# Patient Record
Sex: FEMALE | Race: WHITE | NOT HISPANIC OR LATINO | Employment: FULL TIME | URBAN - METROPOLITAN AREA
[De-identification: names, ages, dates, MRNs, and addresses within clinical notes are randomized per-mention and may not be internally consistent; named-entity substitution may affect disease eponyms.]

---

## 2023-03-30 ENCOUNTER — TELEPHONE (OUTPATIENT)
Dept: PRIMARY CARE | Facility: CLINIC | Age: 68
End: 2023-03-30
Payer: MEDICARE

## 2023-03-30 DIAGNOSIS — R63.2 BINGE EATING: Primary | ICD-10-CM

## 2023-03-30 RX ORDER — LISDEXAMFETAMINE DIMESYLATE 40 MG/1
40 CAPSULE ORAL DAILY
Qty: 30 CAPSULE | Refills: 0 | Status: SHIPPED | OUTPATIENT
Start: 2023-03-30 | End: 2023-04-06 | Stop reason: SDUPTHER

## 2023-03-30 RX ORDER — LISDEXAMFETAMINE DIMESYLATE 40 MG/1
40 CAPSULE ORAL DAILY
COMMUNITY
End: 2023-03-30 | Stop reason: SDUPTHER

## 2023-04-05 NOTE — TELEPHONE ENCOUNTER
Needs to be resent  Says [facility], but it needs to be resent to [facility] because the last name didn't match so the script got canceled   [place]

## 2023-04-06 RX ORDER — LISDEXAMFETAMINE DIMESYLATE 40 MG/1
40 CAPSULE ORAL DAILY
Qty: 30 CAPSULE | Refills: 0 | Status: SHIPPED | OUTPATIENT
Start: 2023-04-06 | End: 2023-05-08 | Stop reason: SDUPTHER

## 2023-04-06 NOTE — TELEPHONE ENCOUNTER
This actually needs resent to [place] [facility], it was supposed to go there originally but accidentally sent to [facility]. Can you please just send this to [place]?

## 2023-05-08 ENCOUNTER — TELEPHONE (OUTPATIENT)
Dept: PRIMARY CARE | Facility: CLINIC | Age: 68
End: 2023-05-08
Payer: MEDICARE

## 2023-05-08 DIAGNOSIS — R63.2 BINGE EATING: ICD-10-CM

## 2023-05-08 RX ORDER — LISDEXAMFETAMINE DIMESYLATE 40 MG/1
40 CAPSULE ORAL DAILY
Qty: 30 CAPSULE | Refills: 0 | Status: SHIPPED | OUTPATIENT
Start: 2023-05-08 | End: 2023-10-02 | Stop reason: ALTCHOICE

## 2023-05-10 LAB
ALANINE AMINOTRANSFERASE (SGPT) (U/L) IN SER/PLAS: 14 U/L (ref 7–45)
ALBUMIN (G/DL) IN SER/PLAS: 3.8 G/DL (ref 3.4–5)
ALKALINE PHOSPHATASE (U/L) IN SER/PLAS: 62 U/L (ref 33–136)
ANION GAP IN SER/PLAS: 9 MMOL/L (ref 10–20)
ASPARTATE AMINOTRANSFERASE (SGOT) (U/L) IN SER/PLAS: 18 U/L (ref 9–39)
BASOPHILS (10*3/UL) IN BLOOD BY AUTOMATED COUNT: 0.06 X10E9/L (ref 0–0.1)
BASOPHILS/100 LEUKOCYTES IN BLOOD BY AUTOMATED COUNT: 1.1 % (ref 0–2)
BILIRUBIN TOTAL (MG/DL) IN SER/PLAS: 0.6 MG/DL (ref 0–1.2)
CALCIUM (MG/DL) IN SER/PLAS: 8.8 MG/DL (ref 8.6–10.3)
CARBON DIOXIDE, TOTAL (MMOL/L) IN SER/PLAS: 28 MMOL/L (ref 21–32)
CHLORIDE (MMOL/L) IN SER/PLAS: 107 MMOL/L (ref 98–107)
CHOLESTEROL (MG/DL) IN SER/PLAS: 192 MG/DL (ref 0–199)
CHOLESTEROL IN HDL (MG/DL) IN SER/PLAS: 52.5 MG/DL
CHOLESTEROL/HDL RATIO: 3.7
CREATININE (MG/DL) IN SER/PLAS: 0.89 MG/DL (ref 0.5–1.05)
EOSINOPHILS (10*3/UL) IN BLOOD BY AUTOMATED COUNT: 0.17 X10E9/L (ref 0–0.7)
EOSINOPHILS/100 LEUKOCYTES IN BLOOD BY AUTOMATED COUNT: 3 % (ref 0–6)
ERYTHROCYTE DISTRIBUTION WIDTH (RATIO) BY AUTOMATED COUNT: 14 % (ref 11.5–14.5)
ERYTHROCYTE MEAN CORPUSCULAR HEMOGLOBIN CONCENTRATION (G/DL) BY AUTOMATED: 31.7 G/DL (ref 32–36)
ERYTHROCYTE MEAN CORPUSCULAR VOLUME (FL) BY AUTOMATED COUNT: 90 FL (ref 80–100)
ERYTHROCYTES (10*6/UL) IN BLOOD BY AUTOMATED COUNT: 4.72 X10E12/L (ref 4–5.2)
GFR FEMALE: 71 ML/MIN/1.73M2
GLUCOSE (MG/DL) IN SER/PLAS: 84 MG/DL (ref 74–99)
HEMATOCRIT (%) IN BLOOD BY AUTOMATED COUNT: 42.6 % (ref 36–46)
HEMOGLOBIN (G/DL) IN BLOOD: 13.5 G/DL (ref 12–16)
IMMATURE GRANULOCYTES/100 LEUKOCYTES IN BLOOD BY AUTOMATED COUNT: 0.4 % (ref 0–0.9)
LDL: 117 MG/DL (ref 0–99)
LEUKOCYTES (10*3/UL) IN BLOOD BY AUTOMATED COUNT: 5.7 X10E9/L (ref 4.4–11.3)
LYMPHOCYTES (10*3/UL) IN BLOOD BY AUTOMATED COUNT: 1.07 X10E9/L (ref 1.2–4.8)
LYMPHOCYTES/100 LEUKOCYTES IN BLOOD BY AUTOMATED COUNT: 18.8 % (ref 13–44)
MONOCYTES (10*3/UL) IN BLOOD BY AUTOMATED COUNT: 0.58 X10E9/L (ref 0.1–1)
MONOCYTES/100 LEUKOCYTES IN BLOOD BY AUTOMATED COUNT: 10.2 % (ref 2–10)
NEUTROPHILS (10*3/UL) IN BLOOD BY AUTOMATED COUNT: 3.8 X10E9/L (ref 1.2–7.7)
NEUTROPHILS/100 LEUKOCYTES IN BLOOD BY AUTOMATED COUNT: 66.5 % (ref 40–80)
PLATELETS (10*3/UL) IN BLOOD AUTOMATED COUNT: 305 X10E9/L (ref 150–450)
POTASSIUM (MMOL/L) IN SER/PLAS: 4.3 MMOL/L (ref 3.5–5.3)
PROTEIN TOTAL: 6.4 G/DL (ref 6.4–8.2)
SODIUM (MMOL/L) IN SER/PLAS: 140 MMOL/L (ref 136–145)
THYROTROPIN (MIU/L) IN SER/PLAS BY DETECTION LIMIT <= 0.05 MIU/L: 1.8 MIU/L (ref 0.44–3.98)
TRIGLYCERIDE (MG/DL) IN SER/PLAS: 114 MG/DL (ref 0–149)
UREA NITROGEN (MG/DL) IN SER/PLAS: 18 MG/DL (ref 6–23)
VLDL: 23 MG/DL (ref 0–40)

## 2023-05-12 ENCOUNTER — TELEPHONE (OUTPATIENT)
Dept: PRIMARY CARE | Facility: CLINIC | Age: 68
End: 2023-05-12
Payer: MEDICARE

## 2023-05-12 NOTE — TELEPHONE ENCOUNTER
----- Message from DORIS Woods sent at 5/10/2023 10:23 PM EDT -----  Labs essentially normal. Will discuss at upcoming office visit

## 2023-05-18 ENCOUNTER — APPOINTMENT (OUTPATIENT)
Dept: PRIMARY CARE | Facility: CLINIC | Age: 68
End: 2023-05-18
Payer: MEDICARE

## 2023-06-19 ENCOUNTER — OFFICE VISIT (OUTPATIENT)
Dept: PRIMARY CARE | Facility: CLINIC | Age: 68
End: 2023-06-19
Payer: MEDICARE

## 2023-06-19 VITALS
TEMPERATURE: 97 F | WEIGHT: 219 LBS | DIASTOLIC BLOOD PRESSURE: 70 MMHG | HEART RATE: 64 BPM | SYSTOLIC BLOOD PRESSURE: 128 MMHG | BODY MASS INDEX: 34.3 KG/M2 | OXYGEN SATURATION: 98 %

## 2023-06-19 DIAGNOSIS — R63.2 BINGE EATING: Primary | ICD-10-CM

## 2023-06-19 DIAGNOSIS — F90.0 ATTENTION DEFICIT HYPERACTIVITY DISORDER (ADHD), PREDOMINANTLY INATTENTIVE TYPE: ICD-10-CM

## 2023-06-19 LAB
AMPHETAMINE (PRESENCE) IN URINE BY SCREEN METHOD: ABNORMAL
BARBITURATES PRESENCE IN URINE BY SCREEN METHOD: ABNORMAL
BENZODIAZEPINE (PRESENCE) IN URINE BY SCREEN METHOD: ABNORMAL
CANNABINOIDS IN URINE BY SCREEN METHOD: ABNORMAL
COCAINE (PRESENCE) IN URINE BY SCREEN METHOD: ABNORMAL
DRUG SCREEN COMMENT URINE: ABNORMAL
FENTANYL URINE: ABNORMAL
METHADONE (PRESENCE) IN URINE BY SCREEN METHOD: ABNORMAL
OPIATES (PRESENCE) IN URINE BY SCREEN METHOD: ABNORMAL
OXYCODONE (PRESENCE) IN URINE BY SCREEN METHOD: ABNORMAL
PHENCYCLIDINE (PRESENCE) IN URINE BY SCREEN METHOD: ABNORMAL

## 2023-06-19 PROCEDURE — 99213 OFFICE O/P EST LOW 20 MIN: CPT | Performed by: NURSE PRACTITIONER

## 2023-06-19 PROCEDURE — 80307 DRUG TEST PRSMV CHEM ANLYZR: CPT

## 2023-06-19 PROCEDURE — 80324 DRUG SCREEN AMPHETAMINES 1/2: CPT

## 2023-06-19 PROCEDURE — 1159F MED LIST DOCD IN RCRD: CPT | Performed by: NURSE PRACTITIONER

## 2023-06-19 RX ORDER — LISDEXAMFETAMINE DIMESYLATE 40 MG/1
40 CAPSULE ORAL EVERY MORNING
Qty: 30 CAPSULE | Refills: 0 | Status: SHIPPED | OUTPATIENT
Start: 2023-06-19 | End: 2023-12-13 | Stop reason: ALTCHOICE

## 2023-06-19 RX ORDER — LISDEXAMFETAMINE DIMESYLATE 40 MG/1
40 CAPSULE ORAL EVERY MORNING
Qty: 30 CAPSULE | Refills: 0 | Status: SHIPPED | OUTPATIENT
Start: 2023-07-19 | End: 2023-12-13 | Stop reason: ALTCHOICE

## 2023-06-19 RX ORDER — LEVOTHYROXINE SODIUM 125 UG/1
125 TABLET ORAL
COMMUNITY
End: 2023-11-01 | Stop reason: SDUPTHER

## 2023-06-19 RX ORDER — LISDEXAMFETAMINE DIMESYLATE 40 MG/1
40 CAPSULE ORAL EVERY MORNING
Qty: 30 CAPSULE | Refills: 0 | Status: SHIPPED | OUTPATIENT
Start: 2023-08-18 | End: 2023-12-13 | Stop reason: ALTCHOICE

## 2023-06-19 ASSESSMENT — ENCOUNTER SYMPTOMS
NEUROLOGICAL NEGATIVE: 1
HEMATOLOGIC/LYMPHATIC NEGATIVE: 1
PSYCHIATRIC NEGATIVE: 1
ENDOCRINE NEGATIVE: 1
ALLERGIC/IMMUNOLOGIC NEGATIVE: 1
RESPIRATORY NEGATIVE: 1
GASTROINTESTINAL NEGATIVE: 1
CARDIOVASCULAR NEGATIVE: 1
EYES NEGATIVE: 1
MUSCULOSKELETAL NEGATIVE: 1
CONSTITUTIONAL NEGATIVE: 1

## 2023-06-19 NOTE — PATIENT INSTRUCTIONS
Urine Drug Screen today   Info on Intermittent fasting     Call in 3 months for refills and we will see you in 6 months

## 2023-06-19 NOTE — PROGRESS NOTES
Subjective   Patient ID: Trini Edge is a 67 y.o. female who presents for Med Refill.    Med Refill     Patient here for medication refill of vyvnase.  No other concerns or complaints.   Recently had two teeth removed to prepared for implants from failed root canals.   Review of Systems   Constitutional: Negative.    HENT:  Positive for dental problem and tinnitus.    Eyes: Negative.    Respiratory: Negative.     Cardiovascular: Negative.    Gastrointestinal: Negative.    Endocrine: Negative.    Genitourinary: Negative.    Musculoskeletal: Negative.    Allergic/Immunologic: Negative.    Neurological: Negative.         Episodes of vertigo, takes meclizine with good affect.    Hematological: Negative.    Psychiatric/Behavioral: Negative.         Objective   /82   Pulse 64   Temp 36.1 °C (97 °F)   Wt 99.3 kg (219 lb)   SpO2 98%   BMI 34.30 kg/m²     Physical Exam  Constitutional:       Appearance: Normal appearance.   Cardiovascular:      Rate and Rhythm: Normal rate and regular rhythm.      Pulses: Normal pulses.      Heart sounds: Normal heart sounds.   Pulmonary:      Effort: Pulmonary effort is normal.      Breath sounds: Normal breath sounds.   Abdominal:      General: Bowel sounds are normal.      Palpations: Abdomen is soft.   Skin:     General: Skin is warm.   Neurological:      General: No focal deficit present.      Mental Status: She is alert and oriented to person, place, and time. Mental status is at baseline.   Psychiatric:         Mood and Affect: Mood normal.         Behavior: Behavior normal.         Thought Content: Thought content normal.         Judgment: Judgment normal.   OARRS:  No data recorded  I have personally reviewed the OARRS report for Trini Edge. I have considered the risks of abuse, dependence, addiction and diversion    Is the patient prescribed a combination of a benzodiazepine and opioid?  Yes, I feel it is clincially indicated to continue the medication and have  discussed with the patient risks/benefits/alternatives.    Last Urine Drug Screen / ordered today: Yes  Recent Results (from the past 17964 hour(s))   Amphetamine Confirm, Urine    Collection Time: 03/02/22  8:30 PM   Result Value Ref Range    Amphetamines,Urine 2,862 ng/mL    MDA, Urine <200 ng/mL    MDEA, Urine <200 ng/mL    MDMA, Urine <200 ng/mL    Methamphetamine Quant, Ur <200 ng/mL    Phentermine,Urine <200 ng/mL   Drug Screen, Urine With Reflex to Confirmation    Collection Time: 03/02/22  8:30 PM   Result Value Ref Range    DRUG SCREEN COMMENT URINE SEE BELOW     Amphetamine Screen, Urine PRESUMPTIVE POSITIVE (A) NEGATIVE    Barbiturate Screen, Urine PRESUMPTIVE NEGATIVE NEGATIVE    BENZODIAZEPINE (PRESENCE) IN URINE BY SCREEN METHOD PRESUMPTIVE NEGATIVE NEGATIVE    Cannabinoid Screen, Urine PRESUMPTIVE NEGATIVE NEGATIVE    Cocaine Screen, Urine PRESUMPTIVE NEGATIVE NEGATIVE    Fentanyl, Ur PRESUMPTIVE NEGATIVE NEGATIVE    Methadone Screen, Urine PRESUMPTIVE NEGATIVE NEGATIVE    Opiate Screen, Urine PRESUMPTIVE NEGATIVE NEGATIVE    Oxycodone Screen, Ur PRESUMPTIVE NEGATIVE NEGATIVE    PCP Screen, Urine PRESUMPTIVE NEGATIVE NEGATIVE     N/A    Controlled Substance Agreement:  Date of the Last Agreement: 6/19/23  Reviewed Controlled Substance Agreement including but not limited to the benefits, risks, and alternatives to treatment with a Controlled Substance medication(s).      Stimulants:   What is the patient's goal of therapy? Binge eating control and ADHD  Is this being achieved with current treatment? yes    Activities of Daily Living:   Is your overall impression that this patient is benefiting (symptom reduction outweighs side effects) from stimulant therapy? Yes     1. Physical Functioning: Same  2. Family Relationship: Same  3. Social Relationship: Same  4. Mood: Same  5. Sleep Patterns: Same  6. Overall Function: Better      Assessment/Plan   Problem List Items Addressed This Visit       Binge  eating - Primary    Relevant Medications    lisdexamfetamine (Vyvanse) 40 mg capsule    lisdexamfetamine (Vyvanse) 40 mg capsule (Start on 7/19/2023)    lisdexamfetamine (Vyvanse) 40 mg capsule (Start on 8/18/2023)    Other Relevant Orders    Drug Screen, Urine With Reflex to Confirmation     Other Visit Diagnoses       Attention deficit hyperactivity disorder (ADHD), predominantly inattentive type        Relevant Medications    lisdexamfetamine (Vyvanse) 40 mg capsule    lisdexamfetamine (Vyvanse) 40 mg capsule (Start on 7/19/2023)    lisdexamfetamine (Vyvanse) 40 mg capsule (Start on 8/18/2023)    Other Relevant Orders    Drug Screen, Urine With Reflex to Confirmation          Urine Drug Screen today   Info on Intermittent fasting     Call in 3 months for refills and we will see you in 6 months

## 2023-06-22 LAB
AMPHETAMINES,URINE: 808 NG/ML
MDA,URINE: <200 NG/ML
MDEA,URINE: <200 NG/ML
MDMA,UR: <200 NG/ML
METHAMPHETAMINE QUANTITATIVE URINE: <200 NG/ML
PHENTERMINE,UR: <200 NG/ML

## 2023-10-02 DIAGNOSIS — R63.2 BINGE EATING: ICD-10-CM

## 2023-10-03 RX ORDER — LISDEXAMFETAMINE DIMESYLATE 40 MG/1
40 CAPSULE ORAL DAILY
Qty: 30 CAPSULE | Refills: 0 | Status: SHIPPED | OUTPATIENT
Start: 2023-10-03 | End: 2023-11-14 | Stop reason: SDUPTHER

## 2023-10-31 PROBLEM — J30.9 ALLERGIC RHINITIS: Status: ACTIVE | Noted: 2023-10-31

## 2023-10-31 PROBLEM — E66.9 OBESITY: Status: ACTIVE | Noted: 2023-10-31

## 2023-10-31 PROBLEM — E78.5 HYPERLIPEMIA: Status: ACTIVE | Noted: 2023-10-31

## 2023-10-31 PROBLEM — R03.0 BLOOD PRESSURE ELEVATED WITHOUT HISTORY OF HTN: Status: ACTIVE | Noted: 2023-10-31

## 2023-10-31 PROBLEM — M25.562 CHRONIC PAIN OF BOTH KNEES: Status: ACTIVE | Noted: 2023-10-31

## 2023-10-31 PROBLEM — F32.A DEPRESSION: Status: ACTIVE | Noted: 2023-10-31

## 2023-10-31 PROBLEM — M17.0 OSTEOARTHRITIS OF KNEES, BILATERAL: Status: ACTIVE | Noted: 2023-10-31

## 2023-10-31 PROBLEM — G89.29 CHRONIC PAIN OF BOTH KNEES: Status: ACTIVE | Noted: 2023-10-31

## 2023-10-31 PROBLEM — R91.8 LUNG NODULES: Status: ACTIVE | Noted: 2023-10-31

## 2023-10-31 PROBLEM — L71.9 ROSACEA: Status: ACTIVE | Noted: 2023-10-31

## 2023-10-31 PROBLEM — M25.561 CHRONIC PAIN OF BOTH KNEES: Status: ACTIVE | Noted: 2023-10-31

## 2023-10-31 PROBLEM — E03.9 ADULT HYPOTHYROIDISM: Status: ACTIVE | Noted: 2023-10-31

## 2023-10-31 PROBLEM — D17.23 LIPOMA OF RIGHT LOWER EXTREMITY: Status: ACTIVE | Noted: 2023-10-31

## 2023-10-31 PROBLEM — E89.0 POSTOPERATIVE PRIMARY HYPOTHYROIDISM: Status: ACTIVE | Noted: 2023-10-31

## 2023-10-31 PROBLEM — Z79.899 LONG-TERM CURRENT USE OF HIGH RISK MEDICATION OTHER THAN ANTICOAGULANT: Status: ACTIVE | Noted: 2023-10-31

## 2023-10-31 RX ORDER — LISDEXAMFETAMINE DIMESYLATE CAPSULES 10 MG/1
10 CAPSULE ORAL DAILY
COMMUNITY
End: 2023-11-01 | Stop reason: WASHOUT

## 2023-10-31 RX ORDER — IBUPROFEN 600 MG/1
600 TABLET ORAL EVERY 6 HOURS PRN
COMMUNITY
Start: 2021-12-07 | End: 2023-11-01 | Stop reason: WASHOUT

## 2023-10-31 RX ORDER — CALCIUM CARBONATE 500(1250)
1 TABLET,CHEWABLE ORAL
COMMUNITY
Start: 2021-12-07 | End: 2023-11-01 | Stop reason: WASHOUT

## 2023-10-31 RX ORDER — MULTIVITAMIN
1 TABLET ORAL DAILY
COMMUNITY
Start: 2007-03-27

## 2023-10-31 RX ORDER — IBUPROFEN 200 MG
CAPSULE ORAL
COMMUNITY
Start: 2021-12-17 | End: 2023-12-13 | Stop reason: ALTCHOICE

## 2023-10-31 RX ORDER — ACETAMINOPHEN 325 MG/1
2 TABLET ORAL EVERY 6 HOURS PRN
COMMUNITY
Start: 2021-12-07 | End: 2023-11-01 | Stop reason: WASHOUT

## 2023-10-31 RX ORDER — METHYLPREDNISOLONE 4 MG
1 TABLET, DOSE PACK ORAL 3 TIMES DAILY
COMMUNITY
Start: 2007-07-27

## 2023-10-31 RX ORDER — OMEGA-3 FATTY ACIDS 1000 MG
1000 CAPSULE ORAL DAILY
COMMUNITY
Start: 2007-03-27

## 2023-10-31 NOTE — PATIENT INSTRUCTIONS
Thank you for choosing [facility]  for your health care needs.  If you have any questions, concerns or medical needs, please feel free to contact our office at [phone].    Please ensure you complete your blood work one week before the next scheduled appointment.       To obtain blood tests   To continue Levothyroxine 125mcg po daily   Take levothyroxine on an empty stomach with water alone, 30-60 minutes before eating or taking other medications, 4 hours before any calcium or iron supplement  For follow up in 6 months

## 2023-10-31 NOTE — PROGRESS NOTES
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     The patient underwent a total thyroidectomy in December 2021. His surgical pathology revealed follicular nodular disease.     She has had no major changes to her health since her last appointment.  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 daily     Symptoms are as listed below:  Energy levels -fatigued  Sleep - NP started her on Vyvanse which has been helpful   Temperature intolerances -admits to cold   Bowel movements -regular; once daily   Hair changes -grey and different texture (more thick)  Skin changes -dry; chronically; fingertips turn white in the cold; itching to right thigh where she had shingles   Palpitations -denies  Diaphoresis -denies   Tremors -denies   Mood - denies changes   Weight changes -stable    Objective   /64 (BP Location: Right arm, Patient Position: Sitting, BP Cuff Size: Large adult)   Pulse 75   Ht 1.727 m (5' 8\")   Wt 99.4 kg (219 lb 3.2 oz)   BMI 33.33 kg/m²    Physical Exam  Vitals and nursing note reviewed.   Constitutional:       General: She is not in acute distress.     Appearance: Normal appearance. She is normal weight.   HENT:      Head: Normocephalic and atraumatic.      Nose: Nose normal.      Mouth/Throat:      Mouth: Mucous membranes are moist.   Eyes:      Extraocular Movements: Extraocular movements intact.   Neck:      Comments: Healed thyroidectomy scar  Cardiovascular:      Rate and Rhythm: Normal rate and regular rhythm.   Pulmonary:      Effort: Pulmonary effort is normal.      Breath sounds: Normal breath sounds.   Musculoskeletal:         General: Normal range of motion.   Skin:     General: Skin is warm.   Neurological:      Mental Status: She is alert and oriented to person, place, and time.   Psychiatric:         Mood and Affect: Mood normal.         Lab Results "   Component Value Date    TSH 1.80 05/10/2023    FREET4 1.40 (H) 11/28/2022       Assessment/Plan   67 year old female presents for follow-up for postoperative hypothyroidism. She underwent a total thyroidectomy in December 2021. Surgical pathology revealed follicular nodular disease.     Postoperative primary hypothyroidism  To obtain blood tests   To continue Levothyroxine 125mcg po daily   Take levothyroxine on an empty stomach with water alone, 30-60 minutes before eating or taking other medications, 4 hours before any calcium or iron supplement  For follow up in 6 months

## 2023-11-01 ENCOUNTER — LAB (OUTPATIENT)
Dept: LAB | Facility: LAB | Age: 68
End: 2023-11-01
Payer: MEDICARE

## 2023-11-01 ENCOUNTER — OFFICE VISIT (OUTPATIENT)
Dept: ENDOCRINOLOGY | Facility: CLINIC | Age: 68
End: 2023-11-01
Payer: MEDICARE

## 2023-11-01 VITALS
SYSTOLIC BLOOD PRESSURE: 126 MMHG | HEIGHT: 68 IN | DIASTOLIC BLOOD PRESSURE: 64 MMHG | HEART RATE: 75 BPM | WEIGHT: 219.2 LBS | BODY MASS INDEX: 33.22 KG/M2

## 2023-11-01 DIAGNOSIS — E89.0 POSTOPERATIVE PRIMARY HYPOTHYROIDISM: ICD-10-CM

## 2023-11-01 DIAGNOSIS — E89.0 POSTOPERATIVE PRIMARY HYPOTHYROIDISM: Primary | ICD-10-CM

## 2023-11-01 LAB
T4 FREE SERPL-MCNC: 1.07 NG/DL (ref 0.61–1.12)
TSH SERPL-ACNC: 5.97 MIU/L (ref 0.44–3.98)

## 2023-11-01 PROCEDURE — 36415 COLL VENOUS BLD VENIPUNCTURE: CPT

## 2023-11-01 PROCEDURE — 1160F RVW MEDS BY RX/DR IN RCRD: CPT | Performed by: INTERNAL MEDICINE

## 2023-11-01 PROCEDURE — 1159F MED LIST DOCD IN RCRD: CPT | Performed by: INTERNAL MEDICINE

## 2023-11-01 PROCEDURE — 84443 ASSAY THYROID STIM HORMONE: CPT

## 2023-11-01 PROCEDURE — 99213 OFFICE O/P EST LOW 20 MIN: CPT | Performed by: INTERNAL MEDICINE

## 2023-11-01 PROCEDURE — 1036F TOBACCO NON-USER: CPT | Performed by: INTERNAL MEDICINE

## 2023-11-01 PROCEDURE — 84439 ASSAY OF FREE THYROXINE: CPT

## 2023-11-01 RX ORDER — LEVOTHYROXINE SODIUM 125 UG/1
125 TABLET ORAL
Qty: 30 TABLET | Refills: 5 | Status: SHIPPED | OUTPATIENT
Start: 2023-11-01 | End: 2023-11-06 | Stop reason: SDUPTHER

## 2023-11-06 DIAGNOSIS — E89.0 POSTOPERATIVE PRIMARY HYPOTHYROIDISM: ICD-10-CM

## 2023-11-06 RX ORDER — LEVOTHYROXINE SODIUM 125 UG/1
TABLET ORAL
Qty: 32 TABLET | Refills: 5 | Status: SHIPPED | OUTPATIENT
Start: 2023-11-06 | End: 2024-04-22

## 2023-11-06 NOTE — RESULT ENCOUNTER NOTE
Labs have been reviewed. The FT4 has normalized but the TSH is elevated above goal.  Please change Levothyroxine to 1 tablet six days per week and 1.5 tablets 1 day per week.  For follow up as scheduled with repeat labs.

## 2023-11-14 ENCOUNTER — TELEPHONE (OUTPATIENT)
Dept: PRIMARY CARE | Facility: CLINIC | Age: 68
End: 2023-11-14
Payer: MEDICARE

## 2023-11-14 DIAGNOSIS — R63.2 BINGE EATING: ICD-10-CM

## 2023-11-14 RX ORDER — LISDEXAMFETAMINE DIMESYLATE 40 MG/1
40 CAPSULE ORAL DAILY
Qty: 30 CAPSULE | Refills: 0 | Status: SHIPPED | OUTPATIENT
Start: 2023-11-14 | End: 2023-12-13 | Stop reason: SDUPTHER

## 2023-12-13 ENCOUNTER — OFFICE VISIT (OUTPATIENT)
Dept: PRIMARY CARE | Facility: CLINIC | Age: 68
End: 2023-12-13
Payer: MEDICARE

## 2023-12-13 VITALS
HEIGHT: 68 IN | SYSTOLIC BLOOD PRESSURE: 130 MMHG | WEIGHT: 218 LBS | DIASTOLIC BLOOD PRESSURE: 80 MMHG | HEART RATE: 68 BPM | BODY MASS INDEX: 33.04 KG/M2

## 2023-12-13 DIAGNOSIS — R63.2 BINGE EATING: ICD-10-CM

## 2023-12-13 DIAGNOSIS — Z13.820 OSTEOPOROSIS SCREENING: ICD-10-CM

## 2023-12-13 DIAGNOSIS — F90.0 ATTENTION DEFICIT HYPERACTIVITY DISORDER (ADHD), PREDOMINANTLY INATTENTIVE TYPE: ICD-10-CM

## 2023-12-13 DIAGNOSIS — Z00.00 MEDICARE ANNUAL WELLNESS VISIT, SUBSEQUENT: Primary | ICD-10-CM

## 2023-12-13 PROCEDURE — 1159F MED LIST DOCD IN RCRD: CPT | Performed by: FAMILY MEDICINE

## 2023-12-13 PROCEDURE — G0009 ADMIN PNEUMOCOCCAL VACCINE: HCPCS | Performed by: FAMILY MEDICINE

## 2023-12-13 PROCEDURE — 1160F RVW MEDS BY RX/DR IN RCRD: CPT | Performed by: FAMILY MEDICINE

## 2023-12-13 PROCEDURE — 99213 OFFICE O/P EST LOW 20 MIN: CPT | Performed by: FAMILY MEDICINE

## 2023-12-13 PROCEDURE — 1170F FXNL STATUS ASSESSED: CPT | Performed by: FAMILY MEDICINE

## 2023-12-13 PROCEDURE — 1036F TOBACCO NON-USER: CPT | Performed by: FAMILY MEDICINE

## 2023-12-13 PROCEDURE — 90732 PPSV23 VACC 2 YRS+ SUBQ/IM: CPT | Performed by: FAMILY MEDICINE

## 2023-12-13 RX ORDER — LISDEXAMFETAMINE DIMESYLATE 40 MG/1
40 CAPSULE ORAL EVERY MORNING
Qty: 30 CAPSULE | Refills: 0 | Status: SHIPPED | OUTPATIENT
Start: 2024-02-10 | End: 2024-04-09 | Stop reason: SDUPTHER

## 2023-12-13 RX ORDER — LISDEXAMFETAMINE DIMESYLATE 40 MG/1
40 CAPSULE ORAL EVERY MORNING
Qty: 30 CAPSULE | Refills: 0 | Status: SHIPPED | OUTPATIENT
Start: 2024-01-12 | End: 2024-05-14 | Stop reason: SDUPTHER

## 2023-12-13 RX ORDER — LISDEXAMFETAMINE DIMESYLATE 40 MG/1
40 CAPSULE ORAL DAILY
Qty: 30 CAPSULE | Refills: 0 | Status: SHIPPED | OUTPATIENT
Start: 2023-12-13 | End: 2024-04-09 | Stop reason: SDUPTHER

## 2023-12-13 ASSESSMENT — ENCOUNTER SYMPTOMS
ARTHRALGIAS: 0
BACK PAIN: 0
SHORTNESS OF BREATH: 0
ABDOMINAL PAIN: 0
NERVOUS/ANXIOUS: 0
COUGH: 0
PALPITATIONS: 0
MYALGIAS: 0
FEVER: 0
CHILLS: 0
FATIGUE: 0
DIARRHEA: 0
HEADACHES: 0
OCCASIONAL FEELINGS OF UNSTEADINESS: 0
LOSS OF SENSATION IN FEET: 0
DYSPHORIC MOOD: 0
DIZZINESS: 0
VOMITING: 0
CONSTIPATION: 0
WEAKNESS: 0
NAUSEA: 0
DEPRESSION: 0

## 2023-12-13 ASSESSMENT — PATIENT HEALTH QUESTIONNAIRE - PHQ9
SUM OF ALL RESPONSES TO PHQ9 QUESTIONS 1 AND 2: 0
1. LITTLE INTEREST OR PLEASURE IN DOING THINGS: NOT AT ALL
2. FEELING DOWN, DEPRESSED OR HOPELESS: NOT AT ALL

## 2023-12-13 ASSESSMENT — COLUMBIA-SUICIDE SEVERITY RATING SCALE - C-SSRS
1. IN THE PAST MONTH, HAVE YOU WISHED YOU WERE DEAD OR WISHED YOU COULD GO TO SLEEP AND NOT WAKE UP?: NO
6. HAVE YOU EVER DONE ANYTHING, STARTED TO DO ANYTHING, OR PREPARED TO DO ANYTHING TO END YOUR LIFE?: NO
2. HAVE YOU ACTUALLY HAD ANY THOUGHTS OF KILLING YOURSELF?: NO

## 2023-12-13 ASSESSMENT — ACTIVITIES OF DAILY LIVING (ADL)
TAKING_MEDICATION: INDEPENDENT
MANAGING_FINANCES: INDEPENDENT
DRESSING: INDEPENDENT
DOING_HOUSEWORK: INDEPENDENT
GROCERY_SHOPPING: INDEPENDENT
BATHING: INDEPENDENT

## 2023-12-13 NOTE — PROGRESS NOTES
Subjective   Patient ID: Trini Edge is a 68 y.o. female who presents for Follow-up (Follow up, ).  OARRS:  Shaniqua Gonzales, GURMEET-CNP on 12/13/2023  2:20 PM  I have personally reviewed the OARRS report for Trini Edge. I have considered the risks of abuse, dependence, addiction and diversion and I believe that it is clinically appropriate for Trini Edge to be prescribed this medication    Is the patient prescribed a combination of a benzodiazepine and opioid?  No    Last Urine Drug Screen / ordered today: No  Recent Results (from the past 8760 hour(s))   Amphetamine Confirm, Urine    Collection Time: 06/19/23  4:32 PM   Result Value Ref Range    Methamphetamine Quant, Ur <200 ng/mL    MDA, Urine <200 ng/mL    MDEA, Urine <200 ng/mL    Phentermine,Urine <200 ng/mL    Amphetamines,Urine 808 ng/mL    MDMA, Urine <200 ng/mL   Drug Screen, Urine With Reflex to Confirmation    Collection Time: 06/19/23  4:32 PM   Result Value Ref Range    DRUG SCREEN COMMENT URINE SEE BELOW     Amphetamine Screen, Urine PRESUMPTIVE POSITIVE (A) NEGATIVE    Barbiturate Screen, Urine PRESUMPTIVE NEGATIVE NEGATIVE    BENZODIAZEPINE (PRESENCE) IN URINE BY SCREEN METHOD PRESUMPTIVE NEGATIVE NEGATIVE    Cannabinoid Screen, Urine PRESUMPTIVE NEGATIVE NEGATIVE    Cocaine Screen, Urine PRESUMPTIVE NEGATIVE NEGATIVE    Fentanyl, Ur PRESUMPTIVE NEGATIVE NEGATIVE    Methadone Screen, Urine PRESUMPTIVE NEGATIVE NEGATIVE    Opiate Screen, Urine PRESUMPTIVE NEGATIVE NEGATIVE    Oxycodone Screen, Ur PRESUMPTIVE NEGATIVE NEGATIVE    PCP Screen, Urine PRESUMPTIVE NEGATIVE NEGATIVE     Results are as expected.         Controlled Substance Agreement:  Date of the Last Agreement: 6/19/23  Reviewed Controlled Substance Agreement including but not limited to the benefits, risks, and alternatives to treatment with a Controlled Substance medication(s).    Stimulants:   What is the patient's goal of therapy? To reduce binge eating   Is this  "being achieved with current treatment? yes    Activities of Daily Living:   Is your overall impression that this patient is benefiting (symptom reduction outweighs side effects) from stimulant therapy? Yes     1. Physical Functioning: Better  2. Family Relationship: Better  3. Social Relationship: Better  4. Mood: Better  5. Sleep Patterns: Better  6. Overall Function: Better    Symptoms of binge eating controlled with vyvanse, also experiences improved concentrations and is able to stay focused on tasks.    Is up to date with age appropriate preventative screenings.          Review of Systems   Constitutional:  Negative for chills, fatigue and fever.   Eyes:  Negative for visual disturbance.   Respiratory:  Negative for cough and shortness of breath.    Cardiovascular:  Negative for chest pain and palpitations.   Gastrointestinal:  Negative for abdominal pain, constipation, diarrhea, nausea and vomiting.   Musculoskeletal:  Negative for arthralgias, back pain and myalgias.   Skin:  Negative for rash.   Neurological:  Negative for dizziness, syncope, weakness and headaches.   Psychiatric/Behavioral:  Negative for dysphoric mood. The patient is not nervous/anxious.        Objective   /80 (BP Location: Left arm, Patient Position: Sitting)   Pulse 68   Ht 1.727 m (5' 8\")   Wt 98.9 kg (218 lb)   BMI 33.15 kg/m²     Physical Exam  Constitutional:       Appearance: Normal appearance.   HENT:      Head: Normocephalic and atraumatic.   Cardiovascular:      Rate and Rhythm: Normal rate and regular rhythm.      Heart sounds: No murmur heard.     No gallop.   Pulmonary:      Effort: Pulmonary effort is normal. No respiratory distress.      Breath sounds: Normal breath sounds.   Abdominal:      General: Bowel sounds are normal. There is no distension.      Tenderness: There is no abdominal tenderness.   Musculoskeletal:         General: Normal range of motion.   Skin:     General: Skin is warm and dry.      Findings: " No lesion or rash.   Neurological:      General: No focal deficit present.      Mental Status: She is alert and oriented to person, place, and time. Mental status is at baseline.   Psychiatric:         Mood and Affect: Mood normal.         Behavior: Behavior normal.         Assessment/Plan   Problem List Items Addressed This Visit             ICD-10-CM    Binge eating R63.2    Relevant Medications    lisdexamfetamine (Vyvanse) 40 mg capsule    lisdexamfetamine (Vyvanse) 40 mg capsule (Start on 2/10/2024)    lisdexamfetamine (Vyvanse) 40 mg capsule (Start on 1/12/2024)    Other Relevant Orders    Follow Up In [facility] - PCP - Established     Other Visit Diagnoses         Codes    Medicare annual wellness visit, subsequent    -  Primary Z00.00    Osteoporosis screening     Z13.820    Attention deficit hyperactivity disorder (ADHD), predominantly inattentive type     F90.0    Relevant Medications    lisdexamfetamine (Vyvanse) 40 mg capsule (Start on 2/10/2024)    lisdexamfetamine (Vyvanse) 40 mg capsule (Start on 1/12/2024)

## 2024-03-13 ENCOUNTER — APPOINTMENT (OUTPATIENT)
Dept: PRIMARY CARE | Facility: CLINIC | Age: 69
End: 2024-03-13
Payer: MEDICARE

## 2024-04-09 ENCOUNTER — APPOINTMENT (OUTPATIENT)
Dept: PRIMARY CARE | Facility: CLINIC | Age: 69
End: 2024-04-09
Payer: MEDICARE

## 2024-04-09 DIAGNOSIS — F90.0 ATTENTION DEFICIT HYPERACTIVITY DISORDER (ADHD), PREDOMINANTLY INATTENTIVE TYPE: ICD-10-CM

## 2024-04-09 DIAGNOSIS — R63.2 BINGE EATING: ICD-10-CM

## 2024-04-09 RX ORDER — LISDEXAMFETAMINE DIMESYLATE 40 MG/1
40 CAPSULE ORAL DAILY
Qty: 30 CAPSULE | Refills: 0 | Status: SHIPPED | OUTPATIENT
Start: 2024-05-09 | End: 2024-05-14 | Stop reason: SDUPTHER

## 2024-04-09 RX ORDER — LISDEXAMFETAMINE DIMESYLATE 40 MG/1
40 CAPSULE ORAL EVERY MORNING
Qty: 30 CAPSULE | Refills: 0 | Status: SHIPPED | OUTPATIENT
Start: 2024-04-09 | End: 2024-05-14 | Stop reason: SDUPTHER

## 2024-04-22 DIAGNOSIS — E89.0 POSTOPERATIVE PRIMARY HYPOTHYROIDISM: ICD-10-CM

## 2024-04-22 RX ORDER — LEVOTHYROXINE SODIUM 125 UG/1
TABLET ORAL
Qty: 96 TABLET | Refills: 1 | Status: SHIPPED | OUTPATIENT
Start: 2024-04-22 | End: 2024-05-08 | Stop reason: SDUPTHER

## 2024-04-22 NOTE — TELEPHONE ENCOUNTER
----- Message from Trini Edge sent at 4/22/2024 12:03 PM EDT -----  Regarding: Refill levothyroxine   Contact: [phone]  Hello - I need a refill on my levothryoxine 125 ugm     1 tab q24h except 1 day a week (Mondays) 1 and 1/2 tab.   [facility]   [phone]   Also I believe I am not due for an appointment until November.  Correct?      Thanks for your help,  Trini Edge  [phone]

## 2024-05-06 ENCOUNTER — LAB (OUTPATIENT)
Dept: LAB | Facility: LAB | Age: 69
End: 2024-05-06
Payer: MEDICARE

## 2024-05-06 DIAGNOSIS — E89.0 POSTOPERATIVE PRIMARY HYPOTHYROIDISM: ICD-10-CM

## 2024-05-06 LAB — TSH SERPL-ACNC: 1.09 MIU/L (ref 0.44–3.98)

## 2024-05-06 PROCEDURE — 84443 ASSAY THYROID STIM HORMONE: CPT

## 2024-05-06 PROCEDURE — 36415 COLL VENOUS BLD VENIPUNCTURE: CPT

## 2024-05-07 NOTE — PROGRESS NOTES
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     The patient underwent a total thyroidectomy in December 2021. His surgical pathology revealed follicular nodular disease.     She tore her meniscus in December 2023. No surgical interevention was warranted.  She was on Meloxicam.      Current Regimen  Levothyroxine 125 mcg 7.5 tablets per week     Symptoms are as listed below:  Energy levels -improved   Sleep - no longer disrupted by knee pain  Temperature intolerances -denies  Bowel movements -regular; once daily   Hair changes - denies  Skin changes -dry; chronically  Palpitations -denies  Diaphoresis -denies   Tremors -denies   Mood - denies changes   Weight changes - minimal gain     Exericse - walks 2-3 miles before work     Review of Systems   Constitutional:  Positive for unexpected weight change (Weight gain).   All other systems reviewed and are negative.    Objective   /82 (BP Location: Left arm, Patient Position: Sitting, BP Cuff Size: Adult)   Pulse 74   Ht 1.727 m (5' 8\")   Wt 100 kg (221 lb)   BMI 33.60 kg/m²    Physical Exam  Vitals and nursing note reviewed.   Constitutional:       General: She is not in acute distress.     Appearance: Normal appearance. She is obese.   HENT:      Head: Normocephalic and atraumatic.      Nose: Nose normal.      Mouth/Throat:      Mouth: Mucous membranes are moist.   Eyes:      Extraocular Movements: Extraocular movements intact.   Neck:      Comments: Healed thyroidectomy scar  Cardiovascular:      Rate and Rhythm: Normal rate and regular rhythm.   Pulmonary:      Effort: Pulmonary effort is normal.      Breath sounds: Normal breath sounds.   Musculoskeletal:         General: Normal range of motion.   Skin:     General: Skin is warm.   Neurological:      Mental Status: She is alert and oriented to person, place, and time.   Psychiatric:         Mood and Affect: Mood normal.         Lab Results " Spoke with patients son bahman. He states [facility] in [facility] stated their policy is no MRIs with her pain pump. He tried explaining to them that she has had MRIs before with no issue but she was adamant and explained its against their policy. Advised I will update Dr Reza and we will go from there. She was able to get the CT scans done and they do have the discs of those.     Component Value Date    TSH 1.09 05/06/2024    FREET4 1.07 11/01/2023       Assessment/Plan   68 year old female presents for follow-up for postoperative hypothyroidism. She underwent a total thyroidectomy in December 2021. Surgical pathology revealed follicular nodular disease. She is clinically euthyroid.    Postoperative primary hypothyroidism  To obtain blood test before the next appointment   To continue Levothyroxine 125mcg po daily for six days per week and 1.5 tablets daily for 1 day per week   Take levothyroxine on an empty stomach with water alone, 30-60 minutes before eating or taking other medications, 4 hours before any calcium or iron supplement  For follow up in 6 months

## 2024-05-07 NOTE — PATIENT INSTRUCTIONS
Thank you for choosing [facility]  for your health care needs.  If you have any questions, concerns or medical needs, please feel free to contact our office at [phone].    Please ensure you complete your blood work one week before the next scheduled appointment.       To obtain blood test before the next appointment   To continue Levothyroxine 125mcg po daily for six days per week and 1.5 tablets daily for 1 day per week   Take levothyroxine on an empty stomach with water alone, 30-60 minutes before eating or taking other medications, 4 hours before any calcium or iron supplement  For follow up in 6 months

## 2024-05-08 ENCOUNTER — APPOINTMENT (OUTPATIENT)
Dept: ENDOCRINOLOGY | Facility: CLINIC | Age: 69
End: 2024-05-08
Payer: MEDICARE

## 2024-05-08 ENCOUNTER — OFFICE VISIT (OUTPATIENT)
Dept: ENDOCRINOLOGY | Facility: CLINIC | Age: 69
End: 2024-05-08
Payer: MEDICARE

## 2024-05-08 VITALS
SYSTOLIC BLOOD PRESSURE: 150 MMHG | HEIGHT: 68 IN | WEIGHT: 221 LBS | DIASTOLIC BLOOD PRESSURE: 82 MMHG | HEART RATE: 74 BPM | BODY MASS INDEX: 33.49 KG/M2

## 2024-05-08 DIAGNOSIS — E89.0 POSTOPERATIVE PRIMARY HYPOTHYROIDISM: ICD-10-CM

## 2024-05-08 PROCEDURE — 1123F ACP DISCUSS/DSCN MKR DOCD: CPT | Performed by: INTERNAL MEDICINE

## 2024-05-08 PROCEDURE — 1160F RVW MEDS BY RX/DR IN RCRD: CPT | Performed by: INTERNAL MEDICINE

## 2024-05-08 PROCEDURE — 1159F MED LIST DOCD IN RCRD: CPT | Performed by: INTERNAL MEDICINE

## 2024-05-08 PROCEDURE — 99213 OFFICE O/P EST LOW 20 MIN: CPT | Performed by: INTERNAL MEDICINE

## 2024-05-08 RX ORDER — LEVOTHYROXINE SODIUM 125 UG/1
TABLET ORAL
Qty: 96 TABLET | Refills: 1 | Status: SHIPPED | OUTPATIENT
Start: 2024-05-08

## 2024-05-08 ASSESSMENT — ENCOUNTER SYMPTOMS: UNEXPECTED WEIGHT CHANGE: 1

## 2024-05-10 NOTE — ASSESSMENT & PLAN NOTE
To obtain blood test before the next appointment   To continue Levothyroxine 125mcg po daily for six days per week and 1.5 tablets daily for 1 day per week   Take levothyroxine on an empty stomach with water alone, 30-60 minutes before eating or taking other medications, 4 hours before any calcium or iron supplement  For follow up in 6 months

## 2024-05-14 ENCOUNTER — OFFICE VISIT (OUTPATIENT)
Dept: PRIMARY CARE | Facility: CLINIC | Age: 69
End: 2024-05-14
Payer: MEDICARE

## 2024-05-14 VITALS
SYSTOLIC BLOOD PRESSURE: 120 MMHG | DIASTOLIC BLOOD PRESSURE: 80 MMHG | OXYGEN SATURATION: 97 % | RESPIRATION RATE: 16 BRPM | HEIGHT: 68 IN | WEIGHT: 219.4 LBS | HEART RATE: 62 BPM | BODY MASS INDEX: 33.25 KG/M2

## 2024-05-14 DIAGNOSIS — E78.2 MIXED HYPERLIPIDEMIA: ICD-10-CM

## 2024-05-14 DIAGNOSIS — Z78.0 ASYMPTOMATIC POSTMENOPAUSAL STATUS: ICD-10-CM

## 2024-05-14 DIAGNOSIS — Z13.220 LIPID SCREENING: Primary | ICD-10-CM

## 2024-05-14 DIAGNOSIS — R63.2 BINGE EATING: ICD-10-CM

## 2024-05-14 DIAGNOSIS — F90.0 ATTENTION DEFICIT HYPERACTIVITY DISORDER (ADHD), PREDOMINANTLY INATTENTIVE TYPE: ICD-10-CM

## 2024-05-14 PROCEDURE — 1036F TOBACCO NON-USER: CPT | Performed by: FAMILY MEDICINE

## 2024-05-14 PROCEDURE — 1159F MED LIST DOCD IN RCRD: CPT | Performed by: FAMILY MEDICINE

## 2024-05-14 PROCEDURE — 1123F ACP DISCUSS/DSCN MKR DOCD: CPT | Performed by: FAMILY MEDICINE

## 2024-05-14 PROCEDURE — 99214 OFFICE O/P EST MOD 30 MIN: CPT | Performed by: FAMILY MEDICINE

## 2024-05-14 PROCEDURE — 1160F RVW MEDS BY RX/DR IN RCRD: CPT | Performed by: FAMILY MEDICINE

## 2024-05-14 PROCEDURE — 1170F FXNL STATUS ASSESSED: CPT | Performed by: FAMILY MEDICINE

## 2024-05-14 PROCEDURE — 1126F AMNT PAIN NOTED NONE PRSNT: CPT | Performed by: FAMILY MEDICINE

## 2024-05-14 RX ORDER — LISDEXAMFETAMINE DIMESYLATE 40 MG/1
40 CAPSULE ORAL DAILY
Qty: 30 CAPSULE | Refills: 0 | Status: SHIPPED | OUTPATIENT
Start: 2024-06-11

## 2024-05-14 RX ORDER — LISDEXAMFETAMINE DIMESYLATE 40 MG/1
40 CAPSULE ORAL DAILY
Qty: 30 CAPSULE | Refills: 0 | Status: CANCELLED | OUTPATIENT
Start: 2024-05-14

## 2024-05-14 RX ORDER — LISDEXAMFETAMINE DIMESYLATE 40 MG/1
40 CAPSULE ORAL EVERY MORNING
Qty: 30 CAPSULE | Refills: 0 | Status: SHIPPED | OUTPATIENT
Start: 2024-07-09 | End: 2024-08-08

## 2024-05-14 RX ORDER — LISDEXAMFETAMINE DIMESYLATE 40 MG/1
40 CAPSULE ORAL EVERY MORNING
Qty: 30 CAPSULE | Refills: 0 | Status: SHIPPED | OUTPATIENT
Start: 2024-05-14 | End: 2024-06-13

## 2024-05-14 SDOH — ECONOMIC STABILITY: FOOD INSECURITY: WITHIN THE PAST 12 MONTHS, YOU WORRIED THAT YOUR FOOD WOULD RUN OUT BEFORE YOU GOT MONEY TO BUY MORE.: NEVER TRUE

## 2024-05-14 SDOH — ECONOMIC STABILITY: FOOD INSECURITY: WITHIN THE PAST 12 MONTHS, THE FOOD YOU BOUGHT JUST DIDN'T LAST AND YOU DIDN'T HAVE MONEY TO GET MORE.: NEVER TRUE

## 2024-05-14 ASSESSMENT — ENCOUNTER SYMPTOMS
PALPITATIONS: 0
ABDOMINAL PAIN: 0
MYALGIAS: 0
WEAKNESS: 0
HEADACHES: 0
LOSS OF SENSATION IN FEET: 0
CHILLS: 0
OCCASIONAL FEELINGS OF UNSTEADINESS: 0
NERVOUS/ANXIOUS: 0
CONSTIPATION: 0
ARTHRALGIAS: 0
DIZZINESS: 0
BACK PAIN: 0
NAUSEA: 1
DIARRHEA: 0
FATIGUE: 0
FEVER: 0
DYSPHORIC MOOD: 0
SHORTNESS OF BREATH: 0
VOMITING: 0
COUGH: 0
DEPRESSION: 0

## 2024-05-14 ASSESSMENT — ANXIETY QUESTIONNAIRES
7. FEELING AFRAID AS IF SOMETHING AWFUL MIGHT HAPPEN: NOT AT ALL
6. BECOMING EASILY ANNOYED OR IRRITABLE: NOT AT ALL
1. FEELING NERVOUS, ANXIOUS, OR ON EDGE: NOT AT ALL
2. NOT BEING ABLE TO STOP OR CONTROL WORRYING: NOT AT ALL
4. TROUBLE RELAXING: NOT AT ALL
GAD7 TOTAL SCORE: 0
5. BEING SO RESTLESS THAT IT IS HARD TO SIT STILL: NOT AT ALL
3. WORRYING TOO MUCH ABOUT DIFFERENT THINGS: NOT AT ALL

## 2024-05-14 ASSESSMENT — PATIENT HEALTH QUESTIONNAIRE - PHQ9
2. FEELING DOWN, DEPRESSED OR HOPELESS: NOT AT ALL
1. LITTLE INTEREST OR PLEASURE IN DOING THINGS: NOT AT ALL
SUM OF ALL RESPONSES TO PHQ9 QUESTIONS 1 AND 2: 0

## 2024-05-14 ASSESSMENT — ACTIVITIES OF DAILY LIVING (ADL)
MANAGING_FINANCES: INDEPENDENT
DOING_HOUSEWORK: INDEPENDENT
TAKING_MEDICATION: INDEPENDENT
GROCERY_SHOPPING: INDEPENDENT
DRESSING: INDEPENDENT
BATHING: INDEPENDENT

## 2024-05-14 ASSESSMENT — PAIN SCALES - GENERAL: PAINLEVEL: 0-NO PAIN

## 2024-05-14 NOTE — PROGRESS NOTES
Subjective   Reason for Visit: Trini Edge is an 68 y.o. female here for a Medicare Wellness visit.          Reviewed all medications by prescribing practitioner or clinical pharmacist (such as prescriptions, OTCs, herbal therapies and supplements) and documented in the medical record.    Symptoms of binge eating controlled with vyvanse, also experiences improved concentrations and is able to stay focused on tasks. Is now getting generic which is making her feel nauseous, would like to return to name brand.      Is due for osteoporosis and breast cancer screenings. CRC due 2027        OARRS:  Shaniqua Gonzales, GURMEET-CARMEN on 5/14/2024  3:12 PM  I have personally reviewed the OARRS report for Trini Edge. I have considered the risks of abuse, dependence, addiction and diversion and I believe that it is clinically appropriate for Trini Edge to be prescribed this medication    Is the patient prescribed a combination of a benzodiazepine and opioid?  No    Last Urine Drug Screen / ordered today: No  Recent Results (from the past 8760 hour(s))   Amphetamine Confirm, Urine    Collection Time: 06/19/23  4:32 PM   Result Value Ref Range    Methamphetamine Quant, Ur <200 ng/mL    MDA, Urine <200 ng/mL    MDEA, Urine <200 ng/mL    Phentermine,Urine <200 ng/mL    Amphetamines,Urine 808 ng/mL    MDMA, Urine <200 ng/mL   Drug Screen, Urine With Reflex to Confirmation    Collection Time: 06/19/23  4:32 PM   Result Value Ref Range    DRUG SCREEN COMMENT URINE SEE BELOW     Amphetamine Screen, Urine PRESUMPTIVE POSITIVE (A) NEGATIVE    Barbiturate Screen, Urine PRESUMPTIVE NEGATIVE NEGATIVE    BENZODIAZEPINE (PRESENCE) IN URINE BY SCREEN METHOD PRESUMPTIVE NEGATIVE NEGATIVE    Cannabinoid Screen, Urine PRESUMPTIVE NEGATIVE NEGATIVE    Cocaine Screen, Urine PRESUMPTIVE NEGATIVE NEGATIVE    Fentanyl, Ur PRESUMPTIVE NEGATIVE NEGATIVE    Methadone Screen, Urine PRESUMPTIVE NEGATIVE NEGATIVE    Opiate Screen, Urine  "PRESUMPTIVE NEGATIVE NEGATIVE    Oxycodone Screen, Ur PRESUMPTIVE NEGATIVE NEGATIVE    PCP Screen, Urine PRESUMPTIVE NEGATIVE NEGATIVE     Results are as expected.         Controlled Substance Agreement:  Date of the Last Agreement: 12/23/23    Reviewed Controlled Substance Agreement including but not limited to the benefits, risks, and alternatives to treatment with a Controlled Substance medication(s).    Stimulants:   What is the patient's goal of therapy? To reduce binge eating episodes  Is this being achieved with current treatment? yes    Activities of Daily Living:   Is your overall impression that this patient is benefiting (symptom reduction outweighs side effects) from stimulant therapy? Yes     1. Physical Functioning: Better  2. Family Relationship: Better  3. Social Relationship: Better  4. Mood: Better  5. Sleep Patterns: Same  6. Overall Function: Better      Patient Care Team:  DORIS Al as PCP - General (Family Medicine)     Review of Systems   Constitutional:  Negative for chills, fatigue and fever.   Eyes:  Negative for visual disturbance.   Respiratory:  Negative for cough and shortness of breath.    Cardiovascular:  Negative for chest pain and palpitations.   Gastrointestinal:  Positive for nausea. Negative for abdominal pain, constipation, diarrhea and vomiting.   Musculoskeletal:  Negative for arthralgias, back pain and myalgias.   Skin:  Negative for rash.   Neurological:  Negative for dizziness, syncope, weakness and headaches.   Psychiatric/Behavioral:  Negative for dysphoric mood. The patient is not nervous/anxious.        Objective   Vitals:  /80 (BP Location: Right arm, Patient Position: Sitting, BP Cuff Size: Adult)   Pulse 62   Resp 16   Ht 1.727 m (5' 8\")   Wt 99.5 kg (219 lb 6.4 oz)   SpO2 97%   BMI 33.36 kg/m²       Physical Exam  Constitutional:       Appearance: Normal appearance.   HENT:      Head: Normocephalic and atraumatic.   Cardiovascular: "      Rate and Rhythm: Normal rate.   Pulmonary:      Effort: Pulmonary effort is normal.   Musculoskeletal:         General: Normal range of motion.   Skin:     General: Skin is warm and dry.      Findings: No lesion or rash.   Neurological:      General: No focal deficit present.      Mental Status: She is alert and oriented to person, place, and time. Mental status is at baseline.   Psychiatric:         Mood and Affect: Mood normal.         Behavior: Behavior normal.         Assessment/Plan   Problem List Items Addressed This Visit       Binge eating    Relevant Medications    Vyvanse 40 mg capsule    Vyvanse 40 mg capsule (Start on 6/11/2024)    Vyvanse 40 mg capsule (Start on 7/9/2024)    Other Relevant Orders    Follow Up In [facility] - PCP - Established    Hyperlipemia    Relevant Orders    Comprehensive Metabolic Panel    CBC and Auto Differential     Other Visit Diagnoses       Lipid screening    -  Primary    Relevant Orders    Lipid Panel    Asymptomatic postmenopausal status        Relevant Orders    XR DEXA bone density    Attention deficit hyperactivity disorder (ADHD), predominantly inattentive type        Relevant Medications    Vyvanse 40 mg capsule    Vyvanse 40 mg capsule (Start on 7/9/2024)

## 2024-06-11 ENCOUNTER — HOSPITAL ENCOUNTER (OUTPATIENT)
Dept: RADIOLOGY | Facility: CLINIC | Age: 69
Discharge: HOME | End: 2024-06-11
Payer: MEDICARE

## 2024-06-11 DIAGNOSIS — Z78.0 ASYMPTOMATIC POSTMENOPAUSAL STATUS: ICD-10-CM

## 2024-06-11 PROCEDURE — 77080 DXA BONE DENSITY AXIAL: CPT

## 2024-06-11 PROCEDURE — 77080 DXA BONE DENSITY AXIAL: CPT | Performed by: RADIOLOGY

## 2024-11-14 ENCOUNTER — APPOINTMENT (OUTPATIENT)
Dept: PRIMARY CARE | Facility: CLINIC | Age: 69
End: 2024-11-14
Payer: MEDICARE

## 2024-11-18 ENCOUNTER — APPOINTMENT (OUTPATIENT)
Dept: ENDOCRINOLOGY | Facility: CLINIC | Age: 69
End: 2024-11-18
Payer: MEDICARE

## 2024-11-18 VITALS
DIASTOLIC BLOOD PRESSURE: 80 MMHG | SYSTOLIC BLOOD PRESSURE: 138 MMHG | HEIGHT: 68 IN | BODY MASS INDEX: 33.8 KG/M2 | HEART RATE: 90 BPM | WEIGHT: 223 LBS

## 2024-11-18 DIAGNOSIS — E89.0 POSTOPERATIVE PRIMARY HYPOTHYROIDISM: ICD-10-CM

## 2024-11-18 PROCEDURE — 3008F BODY MASS INDEX DOCD: CPT | Performed by: INTERNAL MEDICINE

## 2024-11-18 PROCEDURE — G2211 COMPLEX E/M VISIT ADD ON: HCPCS | Performed by: INTERNAL MEDICINE

## 2024-11-18 PROCEDURE — 1160F RVW MEDS BY RX/DR IN RCRD: CPT | Performed by: INTERNAL MEDICINE

## 2024-11-18 PROCEDURE — 99213 OFFICE O/P EST LOW 20 MIN: CPT | Performed by: INTERNAL MEDICINE

## 2024-11-18 PROCEDURE — 1123F ACP DISCUSS/DSCN MKR DOCD: CPT | Performed by: INTERNAL MEDICINE

## 2024-11-18 PROCEDURE — 1159F MED LIST DOCD IN RCRD: CPT | Performed by: INTERNAL MEDICINE

## 2024-11-18 ASSESSMENT — ENCOUNTER SYMPTOMS
UNEXPECTED WEIGHT CHANGE: 1
ARTHRALGIAS: 0

## 2024-11-18 NOTE — PATIENT INSTRUCTIONS
Thank you for choosing [facility]  for your health care needs.  If you have any questions, concerns or medical needs, please feel free to contact our office at [phone].    Please ensure you complete your blood work one week before the next scheduled appointment.     To obtain blood test before the next appointment   To continue Levothyroxine 125mcg po daily for six days per week and 1.5 tablets daily for 1 day per week   Take levothyroxine on an empty stomach with water alone, 30-60 minutes before eating or taking other medications, 4 hours before any calcium or iron supplement  For follow up in 6 months

## 2024-11-18 NOTE — PROGRESS NOTES
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     The patient underwent a total thyroidectomy in December 2021. His surgical pathology revealed follicular nodular disease.     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      Current Regimen  Levothyroxine 125 mcg 7.5 tablets per week     Symptoms are as listed below:  Energy levels -good    Sleep - improving quality   Temperature intolerances -denies  Bowel movements -regular; once daily   Hair changes - denies  Skin changes -dry; chronically  Palpitations -denies  Diaphoresis -denies   Tremors -denies   Weight changes - gained     Exericse - walks 2-3 miles before work; got into a gym     Review of Systems   Constitutional:  Positive for unexpected weight change (Weight aj).   Musculoskeletal:  Negative for arthralgias.   All other systems reviewed and are negative.    Objective   /80 (BP Location: Left arm, Patient Position: Sitting, BP Cuff Size: Adult)   Pulse 90   Ht 1.727 m (5' 8\")   Wt 101 kg (223 lb)   BMI 33.91 kg/m²    Physical Exam  Vitals and nursing note reviewed.   Constitutional:       General: She is not in acute distress.     Appearance: Normal appearance. She is obese.   HENT:      Head: Normocephalic and atraumatic.      Nose: Nose normal.      Mouth/Throat:      Mouth: Mucous membranes are moist.   Eyes:      Extraocular Movements: Extraocular movements intact.   Neck:      Comments: Healed thyroidectomy scar  Cardiovascular:      Rate and Rhythm: Normal rate and regular rhythm.   Pulmonary:      Effort: Pulmonary effort is normal.      Breath sounds: Normal breath sounds.   Musculoskeletal:         General: Normal range of motion.   Skin:     General: Skin is warm.   Neurological:      Mental Status: She is alert and oriented to person, place, and time.   Psychiatric:         Mood and Affect: Mood normal.       Lab Results   Component Value Date    TSH 0.45 10/21/2024    FREET4 1.07 " 11/01/2023       Assessment/Plan   69 year old female presents for follow-up for postoperative hypothyroidism. She underwent a total thyroidectomy in December 2021. Surgical pathology revealed follicular nodular disease. She is clinically euthyroid.    Postoperative primary hypothyroidism  To obtain blood test before the next appointment   To continue Levothyroxine 125mcg po daily for six days per week and 1.5 tablets daily for 1 day per week   Take levothyroxine on an empty stomach with water alone, 30-60 minutes before eating or taking other medications, 4 hours before any calcium or iron supplement  For follow up in 6 months

## 2024-11-20 RX ORDER — LEVOTHYROXINE SODIUM 125 UG/1
TABLET ORAL
Qty: 96 TABLET | Refills: 1 | Status: SHIPPED | OUTPATIENT
Start: 2024-11-20

## 2025-02-13 ENCOUNTER — APPOINTMENT (OUTPATIENT)
Dept: PRIMARY CARE | Facility: CLINIC | Age: 70
End: 2025-02-13
Payer: MEDICARE

## 2025-02-13 VITALS
SYSTOLIC BLOOD PRESSURE: 128 MMHG | RESPIRATION RATE: 16 BRPM | HEIGHT: 68 IN | DIASTOLIC BLOOD PRESSURE: 86 MMHG | OXYGEN SATURATION: 99 % | WEIGHT: 227 LBS | TEMPERATURE: 97.4 F | BODY MASS INDEX: 34.4 KG/M2 | HEART RATE: 78 BPM

## 2025-02-13 DIAGNOSIS — Z79.899 MEDICATION MANAGEMENT: ICD-10-CM

## 2025-02-13 DIAGNOSIS — Z12.31 ENCOUNTER FOR SCREENING MAMMOGRAM FOR BREAST CANCER: ICD-10-CM

## 2025-02-13 DIAGNOSIS — F90.0 ATTENTION DEFICIT HYPERACTIVITY DISORDER (ADHD), PREDOMINANTLY INATTENTIVE TYPE: ICD-10-CM

## 2025-02-13 DIAGNOSIS — Z23 NEED FOR VACCINATION FOR STREP PNEUMONIAE: ICD-10-CM

## 2025-02-13 DIAGNOSIS — E89.0 POSTOPERATIVE PRIMARY HYPOTHYROIDISM: ICD-10-CM

## 2025-02-13 PROCEDURE — G0009 ADMIN PNEUMOCOCCAL VACCINE: HCPCS | Performed by: FAMILY MEDICINE

## 2025-02-13 PROCEDURE — 1123F ACP DISCUSS/DSCN MKR DOCD: CPT | Performed by: FAMILY MEDICINE

## 2025-02-13 PROCEDURE — 1036F TOBACCO NON-USER: CPT | Performed by: FAMILY MEDICINE

## 2025-02-13 PROCEDURE — G2211 COMPLEX E/M VISIT ADD ON: HCPCS | Performed by: FAMILY MEDICINE

## 2025-02-13 PROCEDURE — 99214 OFFICE O/P EST MOD 30 MIN: CPT | Performed by: FAMILY MEDICINE

## 2025-02-13 PROCEDURE — 1159F MED LIST DOCD IN RCRD: CPT | Performed by: FAMILY MEDICINE

## 2025-02-13 PROCEDURE — 90677 PCV20 VACCINE IM: CPT | Performed by: FAMILY MEDICINE

## 2025-02-13 PROCEDURE — 3008F BODY MASS INDEX DOCD: CPT | Performed by: FAMILY MEDICINE

## 2025-02-13 NOTE — PROGRESS NOTES
"Subjective   Trini Edge is a 69 y.o. female who presents for New Patient Visit (/).  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 Found to have adult ADHD  Fhx Alz no major memory changes      Current Outpatient Medications on File Prior to Visit   Medication Sig Dispense Refill    glucosamine sulfate 500 mg tablet Take 1 tablet by mouth 3 times a day.      levothyroxine (Synthroid, Levoxyl) 125 mcg tablet Take 1 tablet daily for 6 days per week.  Take 1.5 tablets daily for 1 day per week 96 tablet 1    multivitamin tablet Take 1 tablet by mouth once daily.      omega-3 1,000 mg capsule capsule Take 1 capsule (1,000 mg) by mouth once daily.      Vyvanse 40 mg capsule Take 1 capsule (40 mg) by mouth once daily in the morning. Do not fill before November 27, 2024. 30 capsule 0    Vyvanse 40 mg capsule Take 1 capsule (40 mg) by mouth once daily. Do not fill before December 23, 2024. 30 capsule 0    Vyvanse 40 mg capsule Take 1 capsule (40 mg) by mouth once daily in the morning. Do not fill before January 21, 2025. 30 capsule 0     No current facility-administered medications on file prior to visit.                  Objective   /86   Pulse 78   Temp 36.3 °C (97.4 °F)   Resp 16   Ht 1.727 m (5' 8\")   Wt 103 kg (227 lb)   SpO2 99%   BMI 34.52 kg/m²    Physical Exam  General: NAD  HEENT:NCAT, PERRLA, nml OP  Neck: no cervical JOSE CARLOS  Heart: RRR no murmur, no edema   Lungs: CTA b/l, no wheeze or rhonchi   GI: abd soft, nontender, nondistended.   MSK: no c/c/e. nml gait   Skin: warm and dry  Psych: cooperative, appropriate affect  Neuro: speech clear. A&Ox3  Assessment/Plan   Problem List Items Addressed This Visit       Postoperative primary hypothyroidism  Per endo        Other Visit Diagnoses           " BMI 34.0-34.9,adult      Consider GLP1   Will work on TLC for now       Encounter for screening mammogram for breast cancer      Due in June ordered       Relevant Orders    BI mammo bilateral screening tomosynthesis    Attention deficit hyperactivity disorder (ADHD), predominantly inattentive type      Stable on vyvanse  UDS/CSA today   I have personally reviewed the OAR[facility] report for the patient. This report is scanned into the electronic medical record. I have considered the risks of abuse, dependence, addiction and diversion.   RTC 6 mo or prn   Call for RF when needed       Need for vaccination for Strep pneumoniae      PNA 20 today       Medication management        Relevant Orders    Opiate/Opioid/Benzo Prescription Compliance

## 2025-02-16 LAB
1OH-MIDAZOLAM UR-MCNC: NEGATIVE NG/ML
7AMINOCLONAZEPAM UR-MCNC: NEGATIVE NG/ML
A-OH ALPRAZ UR-MCNC: NEGATIVE NG/ML
A-OH-TRIAZOLAM UR-MCNC: NEGATIVE NG/ML
AMPHET UR-MCNC: 988 NG/ML
AMPHETAMINES UR QL: POSITIVE NG/ML
BARBITURATES UR QL: NEGATIVE NG/ML
BZE UR QL: NEGATIVE NG/ML
CODEINE UR-MCNC: NEGATIVE NG/ML
CREAT UR-MCNC: 64.7 MG/DL
DRUG SCREEN COMMENT UR-IMP: ABNORMAL
EDDP UR-MCNC: NEGATIVE NG/ML
FENTANYL UR-MCNC: NEGATIVE NG/ML
HYDROCODONE UR-MCNC: NEGATIVE NG/ML
HYDROMORPHONE UR-MCNC: NEGATIVE NG/ML
LORAZEPAM UR-MCNC: NEGATIVE NG/ML
METHADONE UR-MCNC: NEGATIVE NG/ML
METHAMPHET UR-MCNC: NEGATIVE NG/ML
MORPHINE UR-MCNC: NEGATIVE NG/ML
NORDIAZEPAM UR-MCNC: NEGATIVE NG/ML
NORFENTANYL UR-MCNC: NEGATIVE NG/ML
NORHYDROCODONE UR CFM-MCNC: NEGATIVE NG/ML
NOROXYCODONE UR CFM-MCNC: NEGATIVE NG/ML
NORTRAMADOL UR-MCNC: NEGATIVE NG/ML
OH-ETHYLFLURAZ UR-MCNC: NEGATIVE NG/ML
OXAZEPAM UR-MCNC: NEGATIVE NG/ML
OXIDANTS UR QL: NEGATIVE MCG/ML
OXYCODONE UR CFM-MCNC: NEGATIVE NG/ML
OXYMORPHONE UR CFM-MCNC: NEGATIVE NG/ML
PCP UR QL: NEGATIVE NG/ML
PH UR: 5.5 [PH] (ref 4.5–9)
QUEST 6 ACETYLMORPHINE: NEGATIVE NG/ML
QUEST NOTES AND COMMENTS: ABNORMAL
QUEST ZOLPIDEM: NEGATIVE NG/ML
TEMAZEPAM UR-MCNC: NEGATIVE NG/ML
THC UR QL: NEGATIVE NG/ML
TRAMADOL UR-MCNC: NEGATIVE NG/ML
ZOLPIDEM PHENYL-4-CARB UR CFM-MCNC: NEGATIVE NG/ML